# Patient Record
Sex: FEMALE | Race: WHITE | URBAN - METROPOLITAN AREA
[De-identification: names, ages, dates, MRNs, and addresses within clinical notes are randomized per-mention and may not be internally consistent; named-entity substitution may affect disease eponyms.]

---

## 2021-10-25 ENCOUNTER — HOSPITAL ENCOUNTER (EMERGENCY)
Age: 31
Discharge: HOME | End: 2021-10-25

## 2022-10-25 PROBLEM — T19.2XXA: Status: ACTIVE | Noted: 2022-10-25

## 2022-10-25 PROBLEM — L03.116 CELLULITIS OF LEFT LOWER EXTREMITY: Status: ACTIVE | Noted: 2022-10-25

## 2023-10-26 RX ORDER — PANTOPRAZOLE SODIUM 40 MG/1
40 TABLET, DELAYED RELEASE ORAL
COMMUNITY

## 2023-10-26 RX ORDER — TRAMADOL HYDROCHLORIDE 50 MG/1
1 TABLET ORAL EVERY 8 HOURS PRN
COMMUNITY
Start: 2022-11-29 | End: 2024-02-14 | Stop reason: ALTCHOICE

## 2023-10-26 RX ORDER — LISDEXAMFETAMINE DIMESYLATE CAPSULES 10 MG/1
10 CAPSULE ORAL EVERY MORNING
COMMUNITY
Start: 2023-09-15 | End: 2024-02-14 | Stop reason: ALTCHOICE

## 2023-10-26 RX ORDER — CYCLOBENZAPRINE HCL 5 MG
1 TABLET ORAL 3 TIMES DAILY PRN
COMMUNITY
Start: 2023-07-27 | End: 2024-02-14 | Stop reason: ALTCHOICE

## 2023-10-26 RX ORDER — POLYETHYLENE GLYCOL 3350 17 G/17G
17 POWDER, FOR SOLUTION ORAL DAILY PRN
COMMUNITY
Start: 2022-11-29 | End: 2024-02-14 | Stop reason: ALTCHOICE

## 2023-10-26 RX ORDER — BUSPIRONE HYDROCHLORIDE 10 MG/1
10 TABLET ORAL 3 TIMES DAILY
COMMUNITY

## 2023-10-26 RX ORDER — DOCUSATE SODIUM 100 MG/1
1 CAPSULE, LIQUID FILLED ORAL 2 TIMES DAILY
COMMUNITY
Start: 2022-11-29 | End: 2024-02-14 | Stop reason: ALTCHOICE

## 2023-10-26 RX ORDER — FLUOXETINE HYDROCHLORIDE 40 MG/1
40 CAPSULE ORAL DAILY
COMMUNITY

## 2023-10-26 RX ORDER — HYDROXYZINE PAMOATE 25 MG/1
25 CAPSULE ORAL 3 TIMES DAILY PRN
COMMUNITY

## 2023-10-26 RX ORDER — PRAZOSIN HYDROCHLORIDE 1 MG/1
2 CAPSULE ORAL NIGHTLY
COMMUNITY

## 2023-10-26 RX ORDER — IBUPROFEN 600 MG/1
1 TABLET ORAL EVERY 6 HOURS PRN
COMMUNITY
Start: 2023-07-27

## 2023-10-27 ENCOUNTER — OFFICE VISIT (OUTPATIENT)
Dept: PRIMARY CARE | Facility: CLINIC | Age: 33
End: 2023-10-27
Payer: COMMERCIAL

## 2023-10-27 VITALS
RESPIRATION RATE: 16 BRPM | BODY MASS INDEX: 44.41 KG/M2 | HEIGHT: 68 IN | OXYGEN SATURATION: 97 % | HEART RATE: 86 BPM | WEIGHT: 293 LBS | TEMPERATURE: 97.3 F

## 2023-10-27 DIAGNOSIS — Z00.00 GENERAL MEDICAL EXAM: Primary | ICD-10-CM

## 2023-10-27 DIAGNOSIS — E66.9 OBESITY WITHOUT SERIOUS COMORBIDITY, UNSPECIFIED CLASSIFICATION, UNSPECIFIED OBESITY TYPE: ICD-10-CM

## 2023-10-27 PROCEDURE — 99395 PREV VISIT EST AGE 18-39: CPT | Performed by: INTERNAL MEDICINE

## 2023-10-27 PROCEDURE — 99214 OFFICE O/P EST MOD 30 MIN: CPT | Performed by: INTERNAL MEDICINE

## 2023-10-27 ASSESSMENT — ENCOUNTER SYMPTOMS
DIZZINESS: 0
BACK PAIN: 1
PALPITATIONS: 0
NUMBNESS: 0
HEMATURIA: 1
AGITATION: 0
HEADACHES: 0
FEVER: 0
CONSTIPATION: 0
COUGH: 0
ACTIVITY CHANGE: 0
WEAKNESS: 0
ADENOPATHY: 0
SHORTNESS OF BREATH: 0
FATIGUE: 0
ABDOMINAL PAIN: 0
BLOOD IN STOOL: 0
DYSURIA: 0
ALLERGIC/IMMUNOLOGIC NEGATIVE: 1
FREQUENCY: 0

## 2023-10-27 NOTE — PROGRESS NOTES
"Subjective   Patient ID: Nat Herrera is a 33 y.o. female who presents for Discuss Med Ozempic .    She has concerns for not losing weight. She goes to [facility], and takes several psych meds.   She had cervical dysplasia, and had recent Gyn procedure. She had hematuria, ans has seen Dr Palumbo, Urology   And had cystoscopy which was unremarkable .  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 She does not smoke, drink alcohol.          Review of Systems   Constitutional:  Negative for activity change, fatigue and fever.   HENT:  Negative for congestion.         TMJ issue going to see Orthodontist , has seen ENT   Eyes:  Negative for visual disturbance.   Respiratory:  Negative for cough and shortness of breath.    Cardiovascular:  Negative for chest pain, palpitations and leg swelling.   Gastrointestinal:  Negative for abdominal pain, blood in stool and constipation.   Genitourinary:  Positive for hematuria. Negative for dysuria and frequency.   Musculoskeletal:  Positive for back pain.   Skin:  Negative for rash.   Allergic/Immunologic: Negative.    Neurological:  Negative for dizziness, weakness, numbness and headaches.   Hematological:  Negative for adenopathy.   Psychiatric/Behavioral:  Negative for agitation and behavioral problems.         Chronic anxiety , depression       Objective   Pulse 86   Temp 36.3 °C (97.3 °F) (Temporal)   Resp 16   Ht 1.727 m (5' 8\")   Wt 135 kg (297 lb)   SpO2 97%   BMI 45.16 kg/m²     Physical Exam  Constitutional:       Appearance: She is obese.   HENT:      Head: Normocephalic and atraumatic.      Right Ear: External ear normal.      Left Ear: External ear normal.      Nose: No congestion.      Mouth/Throat:      Mouth: Mucous membranes are moist.      Pharynx: Oropharynx is clear. No oropharyngeal exudate.   Eyes:      Conjunctiva/sclera: Conjunctivae normal.   Cardiovascular:      Rate and Rhythm: Regular rhythm.      Pulses: Normal pulses.      Heart " sounds: Normal heart sounds.   Pulmonary:      Effort: Pulmonary effort is normal.      Breath sounds: Normal breath sounds.   Abdominal:      General: Abdomen is flat. Bowel sounds are normal.      Palpations: Abdomen is soft.   Musculoskeletal:         General: Normal range of motion.      Cervical back: Normal range of motion and neck supple.      Right lower leg: No edema.      Left lower leg: No edema.   Skin:     General: Skin is warm and dry.      Capillary Refill: Capillary refill takes less than 2 seconds.   Neurological:      General: No focal deficit present.      Mental Status: She is alert and oriented to person, place, and time.   Psychiatric:         Behavior: Behavior normal.         Assessment/Plan        Hematuria:  Seen by Urology Dr Palumbo , had negative cysto  Hydration, Gyn check    Cervical dysplasia:  Follows with her Gyn, had recent procedure    Low back pain:  Otc analgesics, Het  Stretching exercises, workouts , Yoga    Obesity :  Diet, calorie control , exercise  Med side effect possible ( SSRI )  Refer to Obesity clinic  Check complete labs    Anxiety and depression :  Prozac and Buspar  Psych counselling    Vision check  Flushot, Covid booster advised

## 2023-11-28 ENCOUNTER — LAB (OUTPATIENT)
Dept: LAB | Facility: LAB | Age: 33
End: 2023-11-28
Payer: COMMERCIAL

## 2023-11-28 DIAGNOSIS — E66.9 OBESITY WITHOUT SERIOUS COMORBIDITY, UNSPECIFIED CLASSIFICATION, UNSPECIFIED OBESITY TYPE: ICD-10-CM

## 2023-11-28 DIAGNOSIS — Z00.00 GENERAL MEDICAL EXAM: ICD-10-CM

## 2023-11-28 LAB
ALBUMIN SERPL BCP-MCNC: 4.4 G/DL (ref 3.4–5)
ALP SERPL-CCNC: 57 U/L (ref 33–110)
ALT SERPL W P-5'-P-CCNC: 50 U/L (ref 7–45)
ANION GAP SERPL CALC-SCNC: 11 MMOL/L (ref 10–20)
APPEARANCE UR: ABNORMAL
AST SERPL W P-5'-P-CCNC: 22 U/L (ref 9–39)
BASOPHILS # BLD AUTO: 0.05 X10*3/UL (ref 0–0.1)
BASOPHILS NFR BLD AUTO: 0.6 %
BILIRUB SERPL-MCNC: 0.4 MG/DL (ref 0–1.2)
BILIRUB UR STRIP.AUTO-MCNC: NEGATIVE MG/DL
BUN SERPL-MCNC: 14 MG/DL (ref 6–23)
CALCIUM SERPL-MCNC: 9.2 MG/DL (ref 8.6–10.3)
CHLORIDE SERPL-SCNC: 104 MMOL/L (ref 98–107)
CHOLEST SERPL-MCNC: 159 MG/DL (ref 0–199)
CHOLESTEROL/HDL RATIO: 3.8
CO2 SERPL-SCNC: 27 MMOL/L (ref 21–32)
COLOR UR: YELLOW
CORTIS SERPL-MCNC: 15.9 UG/DL (ref 2.5–20)
CREAT SERPL-MCNC: 0.96 MG/DL (ref 0.5–1.05)
EOSINOPHIL # BLD AUTO: 0.13 X10*3/UL (ref 0–0.7)
EOSINOPHIL NFR BLD AUTO: 1.6 %
ERYTHROCYTE [DISTWIDTH] IN BLOOD BY AUTOMATED COUNT: 14.5 % (ref 11.5–14.5)
GFR SERPL CREATININE-BSD FRML MDRD: 81 ML/MIN/1.73M*2
GLUCOSE SERPL-MCNC: 97 MG/DL (ref 74–99)
GLUCOSE UR STRIP.AUTO-MCNC: NEGATIVE MG/DL
HCT VFR BLD AUTO: 46.3 % (ref 36–46)
HDLC SERPL-MCNC: 41.4 MG/DL
HGB BLD-MCNC: 14.7 G/DL (ref 12–16)
IMM GRANULOCYTES # BLD AUTO: 0.08 X10*3/UL (ref 0–0.7)
IMM GRANULOCYTES NFR BLD AUTO: 1 % (ref 0–0.9)
KETONES UR STRIP.AUTO-MCNC: NEGATIVE MG/DL
LDLC SERPL CALC-MCNC: 83 MG/DL
LEUKOCYTE ESTERASE UR QL STRIP.AUTO: ABNORMAL
LYMPHOCYTES # BLD AUTO: 2.16 X10*3/UL (ref 1.2–4.8)
LYMPHOCYTES NFR BLD AUTO: 27.3 %
MCH RBC QN AUTO: 26.7 PG (ref 26–34)
MCHC RBC AUTO-ENTMCNC: 31.7 G/DL (ref 32–36)
MCV RBC AUTO: 84 FL (ref 80–100)
MONOCYTES # BLD AUTO: 0.42 X10*3/UL (ref 0.1–1)
MONOCYTES NFR BLD AUTO: 5.3 %
MUCOUS THREADS #/AREA URNS AUTO: ABNORMAL /LPF
NEUTROPHILS # BLD AUTO: 5.08 X10*3/UL (ref 1.2–7.7)
NEUTROPHILS NFR BLD AUTO: 64.2 %
NITRITE UR QL STRIP.AUTO: NEGATIVE
NON HDL CHOLESTEROL: 118 MG/DL (ref 0–149)
NRBC BLD-RTO: 0 /100 WBCS (ref 0–0)
PH UR STRIP.AUTO: 5 [PH]
PLATELET # BLD AUTO: 282 X10*3/UL (ref 150–450)
POTASSIUM SERPL-SCNC: 4.1 MMOL/L (ref 3.5–5.3)
PROT SERPL-MCNC: 7.1 G/DL (ref 6.4–8.2)
PROT UR STRIP.AUTO-MCNC: NEGATIVE MG/DL
RBC # BLD AUTO: 5.5 X10*6/UL (ref 4–5.2)
RBC # UR STRIP.AUTO: ABNORMAL /UL
RBC #/AREA URNS AUTO: ABNORMAL /HPF
SODIUM SERPL-SCNC: 138 MMOL/L (ref 136–145)
SP GR UR STRIP.AUTO: 1.02
SQUAMOUS #/AREA URNS AUTO: ABNORMAL /HPF
T4 FREE SERPL-MCNC: 0.75 NG/DL (ref 0.61–1.12)
TRIGL SERPL-MCNC: 173 MG/DL (ref 0–149)
TSH SERPL-ACNC: 4.29 MIU/L (ref 0.44–3.98)
UROBILINOGEN UR STRIP.AUTO-MCNC: <2 MG/DL
VLDL: 35 MG/DL (ref 0–40)
WBC # BLD AUTO: 7.9 X10*3/UL (ref 4.4–11.3)
WBC #/AREA URNS AUTO: ABNORMAL /HPF

## 2023-11-28 PROCEDURE — 83036 HEMOGLOBIN GLYCOSYLATED A1C: CPT

## 2023-11-28 PROCEDURE — 82533 TOTAL CORTISOL: CPT

## 2023-11-28 PROCEDURE — 80053 COMPREHEN METABOLIC PANEL: CPT

## 2023-11-28 PROCEDURE — 84443 ASSAY THYROID STIM HORMONE: CPT

## 2023-11-28 PROCEDURE — 80061 LIPID PANEL: CPT

## 2023-11-28 PROCEDURE — 81001 URINALYSIS AUTO W/SCOPE: CPT

## 2023-11-28 PROCEDURE — 84439 ASSAY OF FREE THYROXINE: CPT

## 2023-11-28 PROCEDURE — 85025 COMPLETE CBC W/AUTO DIFF WBC: CPT

## 2023-11-28 PROCEDURE — 36415 COLL VENOUS BLD VENIPUNCTURE: CPT

## 2023-11-29 LAB
EST. AVERAGE GLUCOSE BLD GHB EST-MCNC: 120 MG/DL
HBA1C MFR BLD: 5.8 %

## 2023-12-04 ENCOUNTER — DOCUMENTATION (OUTPATIENT)
Dept: SURGERY | Facility: HOSPITAL | Age: 33
End: 2023-12-04
Payer: COMMERCIAL

## 2023-12-04 NOTE — PROGRESS NOTES
Arjun hood from the patient in the [facility] box advising she was referred by her PCP, and calling to get scheduled. I called her residence and no answer.  No voicemail picked up. Call ended.

## 2023-12-06 ENCOUNTER — DOCUMENTATION (OUTPATIENT)
Dept: SURGERY | Facility: CLINIC | Age: 33
End: 2023-12-06
Payer: COMMERCIAL

## 2023-12-06 NOTE — PROGRESS NOTES
Received referral. Called the pt scheduled with Bere for [facility]. New pt packet and appt confirmation was sent via confirmed email address.

## 2024-02-07 PROBLEM — Q65.89 HIP DYSPLASIA, CONGENITAL (HHS-HCC): Status: ACTIVE | Noted: 2017-09-15

## 2024-02-07 PROBLEM — G89.29 CHRONIC PAIN: Status: ACTIVE | Noted: 2024-02-07

## 2024-02-07 PROBLEM — E66.01 MORBID OBESITY (MULTI): Status: ACTIVE | Noted: 2024-02-02

## 2024-02-07 PROBLEM — L03.116 CELLULITIS OF LEFT LOWER EXTREMITY: Status: ACTIVE | Noted: 2022-10-25

## 2024-02-07 PROBLEM — M16.12 PRIMARY OSTEOARTHRITIS OF LEFT HIP: Status: ACTIVE | Noted: 2017-09-15

## 2024-02-07 PROBLEM — Z96.649 S/P TOTAL HIP ARTHROPLASTY: Status: ACTIVE | Noted: 2017-10-18

## 2024-02-07 PROBLEM — F32.A DEPRESSION: Status: ACTIVE | Noted: 2024-02-07

## 2024-02-07 PROBLEM — M54.40 LOW BACK PAIN WITH SCIATICA: Status: ACTIVE | Noted: 2024-02-07

## 2024-02-07 NOTE — PROGRESS NOTES
Subjective   Date: 2024 Time: 3:20 PM  Name: Nat Herrera  MRN: [id number]  This is a 33 y.o. female with morbid obesity (Body mass index is 47.61 kg/m².) who presents to clinic for consideration of bariatric surgery. she has attempted and failed multiple diet and exercise regimens for weight loss. Initial Onset of obesity was after pregnancy and then broke leg.  Their goal for surgery is to  be healthier . The patient has tried multiple diets to lose weight including  keto, fad diets . The patient was most successful with the  none . The most pounds lost on this diet were 0 lbs. The patient considers their dietary weakness to be  doesn't think she has problem with diet  The patient reports a  highest weight ever of 298 pounds and lowest weight ever of 155 pounds Distribution of Obesity: is central. Current diet:  none . Compliance: N/A Diet Problems:  doesn't eat breakfast or lunch  } Dietary Details Include:Dietary Details: 1-2 servings of protein, vegetables, fruit.  The patient does not exercise         Comorbidits include joint pain, depression  She is not sure if this is time for her yet.  She is not sure if ready to take vitamins dailyh.  She had a lot of depression the last 5 years.  Had a daughter and broke her leg.  She is tearful in the visit.  Been overweight since teenager.  Notes maintained her weight but had some unhealthy habit.  Was at gym for a while.  Depression and getting support.  Has not talked o htem about weight loss.  She has tried multiple diets.  Her dad  4 years ago.  Calorie counting works for her but she obsesses.  Makes it hard for her to cope.  When kept under 1500 dean/day  Patient has been evaluated with gastroenterology at [facility] for abdominal pain. She underwent EGD on 24. She was prescribed dicyclomine at the time of visit 24.     She is not active and has a bad hip. Still learning to walk down steps    SLEEVE Gastric Surgery For Morbid Obesity  Laparoscopic Longitudinal Gastrectomy       On PPI    How bad is the heartburn? 0 = No symptoms  Heartburn when lying down? 0 = No symptoms  Heartburn when standing up? 0 = No symptoms  Heartburn after meals? 0 = No symptoms  Does heartburn change your diet? 0 = No symptoms  Does heartburn wake you from sleep? 0 = No symptoms  Do you have difficulty swallowing? 0 = No symptoms  Do you have pain with swallowing? 0 = No symptoms  If you take medication, does this affect your daily life? 0 = No symptoms  How bad is the regurgitation? 0 = No symptoms  Regurgitation when lying down? 0 = No symptoms  Regurgitation when standing up? 0 = No symptoms  Regurgitation after meals? 0 = No symptoms  Does regurgitation change your diet? 0 = No symptoms  Does regurgitation wake you from sleep? 0 = No symptoms  How satisfied are you with your present condition? Satisfied      EGD (2/5/2024) at [facility]:  Findings:       The nasopharynx was normal.        The examined esophagus was normal.        Patchy mildly erythematous mucosa without bleeding was found in the        stomach. Biopsies were taken with a cold forceps for Helicobacter        pylori testing. Verification of patient identification for the        specimen was done. Estimated blood loss was minimal.        The examined duodenum was normal.   Impression:            - Normal nasopharynx.                          - Normal esophagus.                          - Erythematous mucosa in the stomach. Biopsied.                          - Normal examined duodenum.   Recommendation:        - Discharge patient to home (ambulatory).                          - Resume previous diet.                          - Continue present medications     CT abdomen/Pelvis (1/21/24) at [facility]:    RESULT:     Liver: No mass.  Hepatic steatosis.     Biliary: No bile duct dilation.     Spleen: No mass. No splenomegaly.     Pancreas: No mass or duct dilation.     Adrenals: No mass.      Kidneys: Bilateral renal cysts.  Otherwise both kidneys enhance normally.    No cortical defects to suggest pyelonephritis or perinephric stranding.    No hydronephrosis.   GI tract: No dilation or wall thickening. Colonic diverticulosis.    Appendix is not inflamed.   Lymph nodes: No abdominal or pelvic lymphadenopathy.   Mesentery/Peritoneum: No ascites or mass.   Retroperitoneum: No mass.   Vasculature: Aorta is nonaneurysmal.   Pelvis: Limited visualization secondary to artifact from left hip   arthroplasty.  IUD without evidence of malposition.   Bones/Soft Tissues: Left CALIN.  No acute osseous findings.   Lower thorax: Noncontributory.    (topogram) images: No additional findings.     IMPRESSION:   No acute findings.     PMH:   Past Medical History:   Diagnosis Date    Hip dysplasia     Depression, gerd, if misses the pantoprazole she know she missed and has dysphgia.  Omeprazole and no symptoms.     PSH:   Past Surgical History:   Procedure Laterality Date    REPLACEMENT TOTAL HIP LATERAL POSITION Left 2016    TONSILLECTOMY      as child      2 hips  Tonsils  C section  Colposcopy  Open reduction and fixation of ankle.      FAMILY HISTORY:  No family history on file.   Family histroy on dad's side, gm with dm, father  of heart disease  SOCIAL HISTORY:  Social History     Tobacco Use    Smoking status: Former     Types: Cigarettes     Quit date:      Years since quitting: 3.1     No drinking or smoking  MEDICATIONS:  Prior to Admission Medications:  Medication Documentation Review Audit       Reviewed by Yennifer Blackburn MD MPH (Physician) on 24 at 1518      Medication Order Taking? Sig Documenting Provider Last Dose Status   amphetamine-dextroamphetamine (Adderall) 10 mg tablet [id number]  TAKE 2 TABLETS BY MOUTH EVERY MORNING AND 1 TABLET AT NOON Historical Provider, MD  Active   busPIRone (Buspar) 10 mg tablet [id number]  Take 1 tablet (10 mg) by mouth 3 times a day. Historical Provider,  "MD  Active     Discontinued 02/14/24 1442     Discontinued 02/14/24 1442   FLUoxetine (PROzac) 40 mg capsule [id number]  Take 1 capsule (40 mg) by mouth once daily. Historical Provider, MD  Active   hydrOXYzine pamoate (Vistaril) 25 mg capsule [id number]  Take 1 capsule (25 mg) by mouth 3 times a day as needed. Historical Provider, MD  Active   ibuprofen 600 mg tablet [id number]  Take 1 tablet (600 mg) by mouth every 6 hours if needed (pain). Historical Provider, MD  Active     Discontinued 02/14/24 1442     Discontinued 02/14/24 1442   pantoprazole (ProtoNix) 40 mg EC tablet [id number]  Take 1 tablet (40 mg) by mouth once daily. Tova Provider, MD  Active     Discontinued 02/14/24 1443   prazosin (Minipress) 1 mg capsule [id number]  Take 2 capsules (2 mg) by mouth once daily at bedtime. Tova Provider, MD  Active     Discontinued 02/14/24 1443                     ALLERGIES:  No Known Allergies    REVIEW OF SYSTEMS:  GENERAL: Negative for malaise, significant weight loss and fever  HEAD: Negative for headache, swelling.  NECK: Negative for lumps, goiter, pain and significant neck swelling  RESPIRATORY: Negative for cough, wheezing or shortness of breath.  CARDIOVASCULAR: Negative for chest pain, leg swelling or palpitations.  GI: Negative for abdominal discomfort, blood in stools or black stools or change in bowel habits  : No history of dysuria, frequency or incontinence  MUSCULOSKELETAL: Negative for joint pain or swelling, back pain or muscle pain.  SKIN: Negative for lesions, rash, and itching.  PSYCH: Negative for sleep disturbance, mood disorder and recent psychosocial stressors.  ENDOCRINE: Negative for cold or heat intolerance, polyuria, polydipsia and goiter.    Objective   PHYSICAL EXAM:  Visit Vitals  /79   Pulse 89   Ht 1.676 m (5' 6\")   Wt 134 kg (295 lb)   BMI 47.61 kg/m²   Smoking Status Former   BSA 2.5 m²     General appearance: obese, NAD  Neuro: AOx3  Head: EOMI; no swelling or " lesions of scalp or face  ENT:  no lumps or lymphadenopathy, thyroid normal to palpation; oropharynx clear, no swelling or erythema  Skin: warm, no erythema or rashes  Lungs: clear to percussion and auscultation  Heart: regular rhythm and S1, S2 normal  Abdomen: soft, non-tender, no masses, no organomegaly  Extremities: Normal exam of the extremities. No swelling or pain.  Psych: no hurried speech, no flight of ideas, normal affect    IMPRESSION:  Nat Hererra is a 33 y.o. female with a bmi of Body mass index is 47.61 kg/m². with the following diagnoses and co-morbidities: depression.  She has a history of binge eating disorder and the depression is very bad. She does have support.      We discussed the sleeve  at [facility] and all questions were answered. risks and benefits were discussed  The patient understands the risks and benefits of the procedure and how the procedure is performed. The patient understands the risks include but are not limited to bleeding, infection, DVT, PE, pneumonia, myocardial infarction, leak along the staple lines, and weight regain. We discussed lifestyle changes necessary to be successful.   She is very unsure about surgery and a little ovewhelmed.  We spent a lot of time discussing the weight loss surgery and the lifestyle changes necessary to make this be successful for her.  We discussed the risks and the benefits of the procedure at length.  The patient states that she does want to live a healthier life.  But she is not sure if she can comply with all of the requirements for surgery.  I discussed with her at length that these are all efforts to help her be healthier.  We also discussed that she is not committed to surgery until were actually taking her back to the operating room.  But the entire workup to getting to surgery is to help her be healthier.  We discussed the procedure at length.  She would like to focus on the sleeve.  She will continue to work with our team to complete  the requirements in preparation for surgery.  All questions were answered    This patient does meet the criteria for a surgical weight loss procedure according to NIH guidelines.  The risks of sleeve gastrectomy, Maria Victoria-en-Y gastric bypass, and duodenal switch surgery including bleeding, leak, wound infection, dehydration, ulcers, internal hernia, DVT/PE, prolonged nausea/vomiting, incomplete resolution of associated medical conditions, reflux, weight regain, vitamin/mineral deficiencies, and death have been explained to the patient and Nat Herrera has expressed understanding and acceptance of them.     The increased risk of substance and alcohol abuse following bariatric surgery was discussed with the patient, along with the negative consequences of substance/alcohol use after surgery including addiction, worsening of mental health disorders, and injury to the stomach. The risk of smoking and vaping (tobacco or any other substance) after bariatric surgery was explained to the patient. This includes risk of anastamotic ulcers, gastritis, bleeding, perforation, stricture, and PO intolerance.  The patient expressed understanding and acceptance of these risks.    The benefits of the above surgeries including weight loss, improvement/resolution of associated medical and mental health conditions, improved mobility, and decreased mortality have been explained the the patient and Nat Herrera has expressed understanding and acceptance of them.  Assessment/Plan   PLAN:  The plan of treatment for Nat Herrera is to continue with the consultations and tests ordered today in hopes of qualifying for pre-operative clearance for bariatric surgery. This includes:  Make some lifestyle changes.   Consult Nutrition for education and MSWL  Consult Psychology-see Dr. Saxena  Consult cardiology  Labs ordered  EGD  PCP for medical optimization  Consult sleep medicine - concern for PETER    The following are some lifestyle changes  you should begin to prepare you for your sleeve surgery.   Eliminate soda and other carbonated beverages from your diet. Carbonation will not be well tolerated after surgery. Try Propel, Vitamin Water Zero, Sobe Lifewater, Crystal Light or water.    Increase fluid consumption to 64 oz daily. Do not drink within 30 minutes of eating as this will liquefy your food and make you hungry more quickly.    Exercise for 30-60 minutes daily. Brisk walking, bike riding and swimming are all examples of healthy exercise. If you are unable to exercise we recommend seated exercise.    Do not skip meals.    Take a multivitamin daily.    Lose weight. In preparation for your surgery it is important that you begin making healthier food choices now. Our dietitian will meet with you to help you select foods lower in calories and higher in nutrition. We would like you to lose at least 10  lbs prior to surgery.     Increase your protein intake to 60 grams per day.    Alcohol is empty calories. Please eliminate while preparing for surgery.    Plan your meals.      General Instruction: 1) Use the information we gave you today to work through your insurance requirements and medical clearances.   2) These documents need to get faxed to the program navigators so they can submit them for approval from your insurance company.   3) Obtain labs today at a  facility. We will call you with any abnormalities and corrections you need to make.   4) Continue to work with your primary care doctor and other specialist so your other health problems are well controlled prior to your surgery.   5) Adopt the recommendations of the program dietician so you develop healthy eating patterns.   6) Work with the sleep team to get your sleep apnea treated to prevent other health problems .   7) Consider attending a support group to learn from other who have been through the process.   8) Come to the MSWL sessions.

## 2024-02-12 PROBLEM — E66.01 MORBID OBESITY WITH BODY MASS INDEX (BMI) OF 40.0 TO 49.9 (MULTI): Status: ACTIVE | Noted: 2024-02-12

## 2024-02-12 PROBLEM — Z13.21 ENCOUNTER FOR VITAMIN DEFICIENCY SCREENING: Status: ACTIVE | Noted: 2024-02-12

## 2024-02-12 PROBLEM — Z01.818 PREOPERATIVE CLEARANCE: Status: ACTIVE | Noted: 2024-02-12

## 2024-02-12 PROBLEM — Z98.84 BARIATRIC SURGERY STATUS: Status: ACTIVE | Noted: 2024-02-12

## 2024-02-14 ENCOUNTER — OFFICE VISIT (OUTPATIENT)
Dept: SURGERY | Facility: CLINIC | Age: 34
End: 2024-02-14
Payer: COMMERCIAL

## 2024-02-14 ENCOUNTER — APPOINTMENT (OUTPATIENT)
Dept: SURGERY | Facility: CLINIC | Age: 34
End: 2024-02-14
Payer: COMMERCIAL

## 2024-02-14 VITALS
BODY MASS INDEX: 47.09 KG/M2 | WEIGHT: 293 LBS | DIASTOLIC BLOOD PRESSURE: 79 MMHG | SYSTOLIC BLOOD PRESSURE: 123 MMHG | HEART RATE: 89 BPM | HEIGHT: 66 IN

## 2024-02-14 DIAGNOSIS — Z01.818 PREOPERATIVE CLEARANCE: ICD-10-CM

## 2024-02-14 DIAGNOSIS — E66.01 MORBID OBESITY (MULTI): ICD-10-CM

## 2024-02-14 DIAGNOSIS — Z98.84 BARIATRIC SURGERY STATUS: ICD-10-CM

## 2024-02-14 DIAGNOSIS — Z13.21 ENCOUNTER FOR VITAMIN DEFICIENCY SCREENING: ICD-10-CM

## 2024-02-14 DIAGNOSIS — E66.01 MORBID OBESITY WITH BODY MASS INDEX (BMI) OF 40.0 TO 49.9 (MULTI): ICD-10-CM

## 2024-02-14 DIAGNOSIS — Z00.00 GENERAL MEDICAL EXAM: ICD-10-CM

## 2024-02-14 PROCEDURE — 99215 OFFICE O/P EST HI 40 MIN: CPT | Performed by: SURGERY

## 2024-02-14 PROCEDURE — 99205 OFFICE O/P NEW HI 60 MIN: CPT | Performed by: SURGERY

## 2024-02-14 RX ORDER — DEXTROAMPHETAMINE SACCHARATE, AMPHETAMINE ASPARTATE, DEXTROAMPHETAMINE SULFATE AND AMPHETAMINE SULFATE 2.5; 2.5; 2.5; 2.5 MG/1; MG/1; MG/1; MG/1
TABLET ORAL
COMMUNITY

## 2024-02-14 NOTE — PATIENT INSTRUCTIONS
Make some lifestyle changes.   Consult Nutrition for education and MSWL  Consult Psychology-see Dr. Saxena  Consult cardiology  Labs ordered  EGD  PCP for medical optimization  Consult sleep medicine - concern for PETER  Remember to do the workup and make the changes we discussed.  This does not commit you to surgery    The following are some lifestyle changes you should begin to prepare you for your sleeve surgery.   Eliminate soda and other carbonated beverages from your diet. Carbonation will not be well tolerated after surgery. Try Propel, Vitamin Water Zero, Sobe Lifewater, Crystal Light or water.    Increase fluid consumption to 64 oz daily. Do not drink within 30 minutes of eating as this will liquefy your food and make you hungry more quickly.    Exercise for 30-60 minutes daily. Brisk walking, bike riding and swimming are all examples of healthy exercise. If you are unable to exercise we recommend seated exercise.    Do not skip meals.    Take a multivitamin daily.    Lose weight. In preparation for your surgery it is important that you begin making healthier food choices now. Our dietitian will meet with you to help you select foods lower in calories and higher in nutrition. We would like you to lose at least 10  lbs prior to surgery.     Increase your protein intake to 60 grams per day.    Alcohol is empty calories. Please eliminate while preparing for surgery.    Plan your meals.    Limit rice, bread, pasta, potatoes to once/week  Limit sugary drinks.   General Instruction: 1) Use the information we gave you today to work through your insurance requirements and medical clearances.   2) These documents need to get faxed to the program navigators so they can submit them for approval from your insurance company.   3) Obtain labs today at a  facility. We will call you with any abnormalities and corrections you need to make.   4) Continue to work with your primary care doctor and other specialist so your  other health problems are well controlled prior to your surgery.   5) Adopt the recommendations of the program dietician so you develop healthy eating patterns.   6) Work with the sleep team to get your sleep apnea treated to prevent other health problems .   7) Consider attending a support group to learn from other who have been through the process.   8) Come to the MSWL sessions.

## 2024-02-15 DIAGNOSIS — Z01.818 PREOPERATIVE CLEARANCE: ICD-10-CM

## 2024-02-15 DIAGNOSIS — E66.01 MORBID OBESITY (MULTI): ICD-10-CM

## 2024-06-14 ENCOUNTER — TELEPHONE (OUTPATIENT)
Dept: SURGERY | Facility: CLINIC | Age: 34
End: 2024-06-14
Payer: COMMERCIAL

## 2024-06-14 NOTE — TELEPHONE ENCOUNTER
Telephone call sent to patient regarding interest in bariatric surgery program. Unable to reach at this time. Let voice message requesting return call with update. Previously had sent [employer] message which has not been read.